# Patient Record
Sex: MALE | Race: OTHER | Employment: OTHER | URBAN - METROPOLITAN AREA
[De-identification: names, ages, dates, MRNs, and addresses within clinical notes are randomized per-mention and may not be internally consistent; named-entity substitution may affect disease eponyms.]

---

## 2021-12-23 ENCOUNTER — COMPREHENSIVE EXAM (OUTPATIENT)
Dept: URBAN - METROPOLITAN AREA CLINIC 36 | Facility: CLINIC | Age: 56
End: 2021-12-23

## 2021-12-23 DIAGNOSIS — H52.7: ICD-10-CM

## 2021-12-23 DIAGNOSIS — H25.812: ICD-10-CM

## 2021-12-23 DIAGNOSIS — H18.513: ICD-10-CM

## 2021-12-23 DIAGNOSIS — H52.13: ICD-10-CM

## 2021-12-23 DIAGNOSIS — H53.10: ICD-10-CM

## 2021-12-23 DIAGNOSIS — H25.811: ICD-10-CM

## 2021-12-23 DIAGNOSIS — H04.123: ICD-10-CM

## 2021-12-23 PROCEDURE — 92015 DETERMINE REFRACTIVE STATE: CPT

## 2021-12-23 PROCEDURE — 92014 COMPRE OPH EXAM EST PT 1/>: CPT

## 2021-12-23 ASSESSMENT — VISUAL ACUITY
OS_PH: 20/80
OD_SC: J>12
OD_SC: CF 5FT
OS_SC: J10
OS_SC: CF 5FT
OD_PH: 20/200

## 2021-12-23 ASSESSMENT — TONOMETRY
OS_IOP_MMHG: 14
OD_IOP_MMHG: 14

## 2021-12-23 NOTE — PATIENT DISCUSSION
Advised patient that no longer meets the [place] requirements to drive in the [facility].

## 2022-01-13 ENCOUNTER — CONSULTATION/EVALUATION (OUTPATIENT)
Dept: URBAN - METROPOLITAN AREA CLINIC 43 | Facility: CLINIC | Age: 57
End: 2022-01-13

## 2022-01-13 DIAGNOSIS — H25.811: ICD-10-CM

## 2022-01-13 DIAGNOSIS — H25.812: ICD-10-CM

## 2022-01-13 DIAGNOSIS — H52.7: ICD-10-CM

## 2022-01-13 DIAGNOSIS — H18.513: ICD-10-CM

## 2022-01-13 DIAGNOSIS — H52.13: ICD-10-CM

## 2022-01-13 DIAGNOSIS — H04.123: ICD-10-CM

## 2022-01-13 DIAGNOSIS — H53.8: ICD-10-CM

## 2022-01-13 PROCEDURE — 92250 FUNDUS PHOTOGRAPHY W/I&R: CPT

## 2022-01-13 PROCEDURE — 92025-2 CORNEAL TOPOGRAPHY, PT

## 2022-01-13 PROCEDURE — 92136TC INTERFEROMETRY - TECHNICAL COMPONENT

## 2022-01-13 PROCEDURE — 92286 ANT SGM IMG I&R SPECLR MIC: CPT

## 2022-01-13 PROCEDURE — 99204 OFFICE O/P NEW MOD 45 MIN: CPT

## 2022-01-13 ASSESSMENT — VISUAL ACUITY
OS_SC: CF 6FT
OD_PH: 20/400
OD_BAT: >20/400
OD_SC: >J12
OD_SC: CF 4FT
OS_SC: >J12
OS_PH: 20/100
OS_BAT: >20/400

## 2022-01-13 ASSESSMENT — TONOMETRY
OS_IOP_MMHG: 14
OD_IOP_MMHG: 12

## 2022-01-13 NOTE — PATIENT DISCUSSION
Advised patient that no longer meets the [place] requirements to drive in the [facility].

## 2022-01-13 NOTE — PATIENT DISCUSSION
Mild fuch's noted on specular microscopy (1/13/22). Monitor after cataract surgery. Discussed possible increased risk of corneal swelling after cataract surgery.

## 2022-02-03 ENCOUNTER — PRE-OP/H&P (OUTPATIENT)
Dept: URBAN - METROPOLITAN AREA SURGERY 14 | Facility: SURGERY | Age: 57
End: 2022-02-03

## 2022-02-03 ENCOUNTER — SURGERY/PROCEDURE (OUTPATIENT)
Dept: URBAN - METROPOLITAN AREA CLINIC 43 | Facility: CLINIC | Age: 57
End: 2022-02-03

## 2022-02-03 DIAGNOSIS — H25.811: ICD-10-CM

## 2022-02-03 DIAGNOSIS — H52.13: ICD-10-CM

## 2022-02-03 DIAGNOSIS — H53.8: ICD-10-CM

## 2022-02-03 DIAGNOSIS — H52.7: ICD-10-CM

## 2022-02-03 DIAGNOSIS — H21.81: ICD-10-CM

## 2022-02-03 DIAGNOSIS — H18.513: ICD-10-CM

## 2022-02-03 DIAGNOSIS — H25.812: ICD-10-CM

## 2022-02-03 DIAGNOSIS — H04.123: ICD-10-CM

## 2022-02-03 PROCEDURE — 99211HP H&P OFFICE/OUTPATIENT VISIT, EST

## 2022-02-03 PROCEDURE — 66982 XCAPSL CTRC RMVL CPLX WO ECP: CPT

## 2022-02-03 NOTE — PATIENT DISCUSSION
Advised patient that no longer meets the [place] requirements to drive in the [facility].

## 2022-02-03 NOTE — PATIENT DISCUSSION
Instructed to call immediately if any new distortion, blurring, decreased vision or eye pain. Last OV 8/16/21  Future OV 11/16/21  Recent Labs 8/16/21

## 2022-02-04 ENCOUNTER — POST-OP (OUTPATIENT)
Dept: URBAN - METROPOLITAN AREA CLINIC 36 | Facility: CLINIC | Age: 57
End: 2022-02-04

## 2022-02-04 DIAGNOSIS — Z96.1: ICD-10-CM

## 2022-02-04 PROCEDURE — 99024 POSTOP FOLLOW-UP VISIT: CPT

## 2022-02-04 ASSESSMENT — TONOMETRY
OD_IOP_MMHG: 14
OS_IOP_MMHG: 14

## 2022-02-04 ASSESSMENT — VISUAL ACUITY
OS_SC: J12
OD_SC: 20/25
OD_SC: J12
OS_SC: 20/400

## 2022-02-04 NOTE — PATIENT DISCUSSION
Advised patient that no longer meets the [place] requirements to drive in the [facility].

## 2022-02-11 ENCOUNTER — POST OP/EVAL OF SECOND EYE (OUTPATIENT)
Dept: URBAN - METROPOLITAN AREA CLINIC 36 | Facility: CLINIC | Age: 57
End: 2022-02-11

## 2022-02-11 DIAGNOSIS — Z96.1: ICD-10-CM

## 2022-02-11 DIAGNOSIS — H04.123: ICD-10-CM

## 2022-02-11 DIAGNOSIS — H25.812: ICD-10-CM

## 2022-02-11 DIAGNOSIS — H18.513: ICD-10-CM

## 2022-02-11 DIAGNOSIS — H52.13: ICD-10-CM

## 2022-02-11 PROCEDURE — 99024 POSTOP FOLLOW-UP VISIT: CPT

## 2022-02-11 PROCEDURE — 92012 INTRM OPH EXAM EST PATIENT: CPT

## 2022-02-11 ASSESSMENT — VISUAL ACUITY
OS_SC: 20/400
OD_SC: 20/20
OD_SC: J10-

## 2022-02-11 ASSESSMENT — TONOMETRY
OS_IOP_MMHG: 14
OD_IOP_MMHG: 14

## 2022-02-11 NOTE — PATIENT DISCUSSION
Advised patient that no longer meets the [facility] requirements to drive in the [facility].

## 2022-02-11 NOTE — PATIENT DISCUSSION
Cataract surgery has been performed in the first eye and activities of daily living are still impaired. The patient would like to proceed with cataract surgery in the second eye as scheduled. The patient elects BV OS, goal of [employer] PRN.

## 2022-03-04 ENCOUNTER — POST-OP (OUTPATIENT)
Dept: URBAN - METROPOLITAN AREA CLINIC 36 | Facility: CLINIC | Age: 57
End: 2022-03-04

## 2022-03-04 DIAGNOSIS — Z96.1: ICD-10-CM

## 2022-03-04 PROCEDURE — 99024 POSTOP FOLLOW-UP VISIT: CPT

## 2022-03-04 ASSESSMENT — VISUAL ACUITY
OD_SC: 20/20-1
OD_SC: J10
OS_SC: 20/400

## 2022-03-04 ASSESSMENT — TONOMETRY
OD_IOP_MMHG: 16
OS_IOP_MMHG: 14

## 2022-03-04 NOTE — PATIENT DISCUSSION
Cataract surgery has been performed in the first eye and activities of daily living are still impaired. The patient would like to proceed with cataract surgery in the second eye as soon as has the finances to do so. The patient elects BV OS, goal of [employer] PRN.

## 2022-03-04 NOTE — PATIENT DISCUSSION
Advised patient that no longer meets the [place] requirements to drive in the [facility].

## 2022-04-19 ENCOUNTER — PRE-OP/H&P (OUTPATIENT)
Dept: URBAN - METROPOLITAN AREA SURGERY 14 | Facility: SURGERY | Age: 57
End: 2022-04-19

## 2022-04-19 ENCOUNTER — SURGERY/PROCEDURE (OUTPATIENT)
Dept: URBAN - METROPOLITAN AREA CLINIC 43 | Facility: CLINIC | Age: 57
End: 2022-04-19

## 2022-04-19 DIAGNOSIS — H52.7: ICD-10-CM

## 2022-04-19 DIAGNOSIS — H53.8: ICD-10-CM

## 2022-04-19 DIAGNOSIS — H25.812: ICD-10-CM

## 2022-04-19 DIAGNOSIS — Z96.1: ICD-10-CM

## 2022-04-19 PROCEDURE — 66982 XCAPSL CTRC RMVL CPLX WO ECP: CPT

## 2022-04-19 PROCEDURE — 99211HP H&P OFFICE/OUTPATIENT VISIT, EST

## 2022-04-19 NOTE — PATIENT DISCUSSION
Advised patient that no longer meets the [place] requirements to drive in the [facility].

## 2022-04-19 NOTE — PATIENT DISCUSSION
Advised patient that no longer meets the [place] requirements to drive in the [facility].

## 2022-04-20 ENCOUNTER — POST-OP (OUTPATIENT)
Dept: URBAN - METROPOLITAN AREA CLINIC 36 | Facility: CLINIC | Age: 57
End: 2022-04-20

## 2022-04-20 DIAGNOSIS — Z96.1: ICD-10-CM

## 2022-04-20 PROCEDURE — 99024 POSTOP FOLLOW-UP VISIT: CPT

## 2022-04-20 ASSESSMENT — VISUAL ACUITY
OD_SC: J8
OD_SC: 20/25-1
OU_SC: J6
OU_SC: 20/30
OS_SC: J6
OS_SC: 20/40+1

## 2022-04-20 ASSESSMENT — TONOMETRY
OD_IOP_MMHG: 13
OS_IOP_MMHG: 14

## 2022-04-20 NOTE — PATIENT DISCUSSION
Advised patient that no longer meets the [place] requirements to drive in the [facility].

## 2022-04-27 ENCOUNTER — POST-OP (OUTPATIENT)
Dept: URBAN - METROPOLITAN AREA CLINIC 36 | Facility: CLINIC | Age: 57
End: 2022-04-27

## 2022-04-27 DIAGNOSIS — Z96.1: ICD-10-CM

## 2022-04-27 PROCEDURE — 99024 POSTOP FOLLOW-UP VISIT: CPT

## 2022-04-27 ASSESSMENT — VISUAL ACUITY
OS_CC: J4
OU_SC: 20/20
OD_SC: 20/20
OS_SC: 20/25
OU_SC: J4
OU_CC: J4
OD_SC: J6
OD_CC: J6
OS_SC: J4

## 2022-04-27 ASSESSMENT — TONOMETRY
OS_IOP_MMHG: 14
OD_IOP_MMHG: 14

## 2022-04-27 NOTE — PATIENT DISCUSSION
Advised patient that no longer meets the [facility] requirements to drive in the [facility].

## 2022-05-18 ENCOUNTER — POST-OP (OUTPATIENT)
Dept: URBAN - METROPOLITAN AREA CLINIC 36 | Facility: CLINIC | Age: 57
End: 2022-05-18

## 2022-05-18 DIAGNOSIS — H53.8: ICD-10-CM

## 2022-05-18 DIAGNOSIS — H52.7: ICD-10-CM

## 2022-05-18 DIAGNOSIS — Z96.1: ICD-10-CM

## 2022-05-18 ASSESSMENT — VISUAL ACUITY
OD_SC: J5
OS_SC: 20/20
OS_SC: J5
OD_SC: 20/20

## 2022-05-18 ASSESSMENT — TONOMETRY
OS_IOP_MMHG: 14
OD_IOP_MMHG: 14

## 2022-05-18 NOTE — PATIENT DISCUSSION
Advised patient that no longer meets the [facility] requirements to drive in the [facility].

## 2023-12-07 NOTE — PATIENT DISCUSSION
Due to be seen in late February, not scheduled, booking into May. Courtesy ltter sent to schedule.     Wanda Noe RN     Patient advised to call if any problems, questions, or concerns.